# Patient Record
Sex: FEMALE | Race: WHITE | HISPANIC OR LATINO
[De-identification: names, ages, dates, MRNs, and addresses within clinical notes are randomized per-mention and may not be internally consistent; named-entity substitution may affect disease eponyms.]

---

## 2017-01-12 ENCOUNTER — APPOINTMENT (OUTPATIENT)
Dept: OBGYN | Facility: CLINIC | Age: 27
End: 2017-01-12

## 2019-05-20 ENCOUNTER — EMERGENCY (EMERGENCY)
Facility: HOSPITAL | Age: 29
LOS: 1 days | Discharge: HOME | End: 2019-05-20
Attending: EMERGENCY MEDICINE
Payer: MEDICAID

## 2019-05-20 VITALS
HEIGHT: 70 IN | OXYGEN SATURATION: 100 % | RESPIRATION RATE: 16 BRPM | WEIGHT: 141.98 LBS | DIASTOLIC BLOOD PRESSURE: 60 MMHG | SYSTOLIC BLOOD PRESSURE: 110 MMHG | TEMPERATURE: 98 F | HEART RATE: 71 BPM

## 2019-05-20 PROCEDURE — 76801 OB US < 14 WKS SINGLE FETUS: CPT

## 2019-05-20 PROCEDURE — 86900 BLOOD TYPING SEROLOGIC ABO: CPT

## 2019-05-20 PROCEDURE — 86850 RBC ANTIBODY SCREEN: CPT

## 2019-05-20 PROCEDURE — 76817 TRANSVAGINAL US OBSTETRIC: CPT

## 2019-05-20 PROCEDURE — 85027 COMPLETE CBC AUTOMATED: CPT

## 2019-05-20 PROCEDURE — 99284 EMERGENCY DEPT VISIT MOD MDM: CPT | Mod: 25

## 2019-05-20 PROCEDURE — 76801 OB US < 14 WKS SINGLE FETUS: CPT | Mod: 26

## 2019-05-20 PROCEDURE — 76817 TRANSVAGINAL US OBSTETRIC: CPT | Mod: 26

## 2019-05-20 PROCEDURE — 99285 EMERGENCY DEPT VISIT HI MDM: CPT

## 2019-05-20 PROCEDURE — 36415 COLL VENOUS BLD VENIPUNCTURE: CPT

## 2019-05-20 NOTE — ED PROVIDER NOTE - OBJECTIVE STATEMENT
29 y/o F, , with no significant PMHx and no significant PSHx presents to the ED with c/o lower back and lower abd pain x yesterday. Pt states she is 8 weeks pregnancy but has not had any prenatal care. Pt notes she has had 1 ectopic pregnancy in the past, so she is concerned. Pt denies urinary sxs, vaginal bleeding, or any other complaints. NKDA.

## 2019-05-20 NOTE — ED PROVIDER NOTE - CLINICAL SUMMARY MEDICAL DECISION MAKING FREE TEXT BOX
29 y/o F, , 8 weeks pregnant, presents to the Ed with lower back and lower abd pain x yesterday. Will check US, labs, and reassess.

## 2019-05-20 NOTE — ED ADULT TRIAGE NOTE - CHIEF COMPLAINT QUOTE
interpreting " she's having pain on her back and lower stomach since yesterday, no blood, she's eight weeks pregnant "

## 2019-05-20 NOTE — ED PROVIDER NOTE - PROGRESS NOTE DETAILS
S.o from Dr. Rush, f/u diapnotics US reported SLIUP at 8wk. Pt has no current complaints and requesting discharge. Pt is well appearing walking with normal gait, stable for discharge and follow up with medical doctor. Pt educated on care and need for follow up. Discussed anticipatory guidance and return precautions. Questions answered. I had a detailed discussion with the patient and/or guardian regarding the historical points, exam findings, and any diagnostic results supporting the discharge diagnosis.

## 2019-05-21 VITALS
SYSTOLIC BLOOD PRESSURE: 108 MMHG | OXYGEN SATURATION: 100 % | HEART RATE: 82 BPM | TEMPERATURE: 98 F | RESPIRATION RATE: 16 BRPM | DIASTOLIC BLOOD PRESSURE: 68 MMHG

## 2019-05-21 LAB
HCT VFR BLD CALC: 38.3 % — SIGNIFICANT CHANGE UP (ref 34.5–45)
HGB BLD-MCNC: 11.8 G/DL — SIGNIFICANT CHANGE UP (ref 11.5–15.5)
MCHC RBC-ENTMCNC: 24.4 PG — LOW (ref 27–34)
MCHC RBC-ENTMCNC: 30.8 GM/DL — LOW (ref 32–36)
MCV RBC AUTO: 79.1 FL — LOW (ref 80–100)
PLATELET # BLD AUTO: 296 K/UL — SIGNIFICANT CHANGE UP (ref 150–400)
RBC # BLD: 4.84 M/UL — SIGNIFICANT CHANGE UP (ref 3.8–5.2)
RBC # FLD: 20.6 % — HIGH (ref 10.3–14.5)
WBC # BLD: 8.5 K/UL — SIGNIFICANT CHANGE UP (ref 3.8–10.5)
WBC # FLD AUTO: 8.5 K/UL — SIGNIFICANT CHANGE UP (ref 3.8–10.5)

## 2019-05-24 ENCOUNTER — APPOINTMENT (OUTPATIENT)
Dept: OBGYN | Facility: CLINIC | Age: 29
End: 2019-05-24

## 2021-06-08 ENCOUNTER — EMERGENCY (EMERGENCY)
Facility: HOSPITAL | Age: 31
LOS: 1 days | Discharge: HOME | End: 2021-06-08
Attending: EMERGENCY MEDICINE
Payer: MEDICAID

## 2021-06-08 VITALS
WEIGHT: 134.92 LBS | HEART RATE: 89 BPM | DIASTOLIC BLOOD PRESSURE: 67 MMHG | OXYGEN SATURATION: 98 % | SYSTOLIC BLOOD PRESSURE: 101 MMHG | HEIGHT: 70 IN | RESPIRATION RATE: 18 BRPM | TEMPERATURE: 99 F

## 2021-06-08 DIAGNOSIS — Z98.51 TUBAL LIGATION STATUS: Chronic | ICD-10-CM

## 2021-06-08 LAB
APPEARANCE UR: ABNORMAL
BILIRUB UR-MCNC: NEGATIVE — SIGNIFICANT CHANGE UP
COLOR SPEC: ABNORMAL
DIFF PNL FLD: ABNORMAL
GLUCOSE UR QL: NEGATIVE — SIGNIFICANT CHANGE UP
KETONES UR-MCNC: ABNORMAL
LEUKOCYTE ESTERASE UR-ACNC: ABNORMAL
NITRITE UR-MCNC: NEGATIVE — SIGNIFICANT CHANGE UP
PH UR: 6.5 — SIGNIFICANT CHANGE UP (ref 5–8)
PROT UR-MCNC: 500 MG/DL
SP GR SPEC: 1.02 — SIGNIFICANT CHANGE UP (ref 1.01–1.02)
UROBILINOGEN FLD QL: NEGATIVE — SIGNIFICANT CHANGE UP

## 2021-06-08 PROCEDURE — 87086 URINE CULTURE/COLONY COUNT: CPT

## 2021-06-08 PROCEDURE — 87491 CHLMYD TRACH DNA AMP PROBE: CPT

## 2021-06-08 PROCEDURE — 81025 URINE PREGNANCY TEST: CPT

## 2021-06-08 PROCEDURE — 87591 N.GONORRHOEAE DNA AMP PROB: CPT

## 2021-06-08 PROCEDURE — 99283 EMERGENCY DEPT VISIT LOW MDM: CPT

## 2021-06-08 PROCEDURE — 81001 URINALYSIS AUTO W/SCOPE: CPT

## 2021-06-08 RX ORDER — PHENAZOPYRIDINE HCL 100 MG
2 TABLET ORAL
Qty: 12 | Refills: 0
Start: 2021-06-08 | End: 2021-06-09

## 2021-06-08 RX ORDER — NITROFURANTOIN MACROCRYSTAL 50 MG
1 CAPSULE ORAL
Qty: 14 | Refills: 0
Start: 2021-06-08 | End: 2021-06-14

## 2021-06-08 NOTE — ED PROVIDER NOTE - OBJECTIVE STATEMENT
29 y/o female, previously healthy, h/o remote b/l tubal ligation, presents with dysuria and hematuria, as well as non-radiating suprapubic abdominal cramping since today. Pain is present when she has the urge to go and at the end of urination. She has never had symptomatic UTI, although had UTI once during pregnancy on testing. Denies history of kidney stones, vaginal discharge or bleeding, fever, vomiting, diarrhea, constipation, or all other symptoms. Relates she is monogamous with her  of 9 years. 29 y/o female, previously healthy, h/o remote b/l tubal ligation, presents with dysuria and hematuria, as well as non-radiating suprapubic abdominal cramping since today. Pain is present when she has the urge to go and at the end of urination only. She has never had symptomatic UTI, although had UTI once during pregnancy on testing. Denies history of kidney stones, vaginal discharge or bleeding, fever, vomiting, diarrhea, constipation, or all other symptoms. Relates she is monogamous with her  of 9 years.

## 2021-06-08 NOTE — ED PROVIDER NOTE - PHYSICAL EXAMINATION
Afebrile, hemodynamically stable, saturating well  NAD, well appearing, sitting comfortably in bed  Head NCAT  EOMI grossly, anicteric  MMM  RRR, nml S1/S2, no m/r/g  Lungs CTAB, no w/r/r  Abd soft, NT, ND, nml BS, no rebound or guarding, no CVAT  AAO, CN's 3-12 grossly intact  ALEXANDRA spontaneously, no leg cyanosis or edema  Skin warm, well perfused, no rashes or hives

## 2021-06-08 NOTE — ED PROVIDER NOTE - NSFOLLOWUPINSTRUCTIONS_ED_ALL_ED_FT
Will un seguimiento con rivera médico de atención primaria u obstetra / ginecólogo en 1-2 días.  Utilice el antibiótico (Macrobid) y el analgésico según lo prescrito.  También puede usar ibuprofeno o acetaminofén según sea necesario para el dolor.  Regrese al departamento de emergencias si el dolor, los mareos, los vómitos, la fiebre, el dolor de espalda o cualquier otro síntoma empeoran.      Infección del tracto urinario en mujeres    LO QUE NECESITA SABER:    Nicolas infección en el tracto urinario (ITU) ocurre cuando entran bacterias en rivera tracto urinario. La mayoría de las bacterias que entran al tracto urinario salen al orinar. Si la bacteria permanece en el tracto urinario, usted podría contraer nicolas infección. Rivera tracto urinario incluye miah riñones, uréteres, vejiga y uretra. La orina es producida en los riñones y fluye del uréter a la vejiga. La orina sale de la vejiga a través de la uretra. Nicolas infección del tracto urinario es más común in la parte inferior de rivera tracto urinario que incluye rivera vejiga y uretra.     Aparato urinario femenino    INSTRUCCIONES SOBRE EL ARMAAN HOSPITALARIA:    Regrese a la zacarias de emergencias si:  •Usted está orinando muy poco o nada en absoluto.  •Usted tiene fiebre armaan con temblor y escalofríos.  •Usted tiene dolor en el costado o en la espalda que empeora.    Llame a rivera médico si:  •Tiene fiebre.  •Usted no siente mejoría después de 2 días de jia los antibióticos.  •Usted está vomitando.  •Usted tiene preguntas o inquietudes acerca de rivera condición o cuidado.    Medicamentos:  •Los antibióticosayudan a combatir nicolas infección bacteriana. Si tiene infecciones urinarias con frecuencia (llamadas recurrentes), se le pueden claudio antibióticos para que los tome regularmente. Le enseñarán cuándo y cómo usar los antibióticos. El objetivo es prevenir las infecciones urinarias, jakob no causar resistencia a los antibióticos mediante el uso de antibióticos con demasiada frecuencia.  •Los medicamentospara disminuir el dolor y el ardor al orinar. También ayudarán a disminuir la sensación de necesitar orinar con frecuencia. Estos medicamentos harán que orine de color anaranjado o vail.  •[address] miah medicamentos eric se le haya indicado.Consulte con rivera médico si usted jaren que rivera medicamento no le está ayudando o si presenta efectos secundarios. Infórmele si es alérgico a cualquier medicamento. Mantenga nicolas lista actualizada de los medicamentos, las vitaminas y los productos herbales que jasmeet. Incluya los siguientes datos de los medicamentos: cantidad, frecuencia y motivo de administración. Traiga con usted la lista o los envases de las píldoras a miah citas de seguimiento. Lleve la lista de los medicamentos con usted en rafael de nicolas emergencia.    Acuda a miah consultas de control con rivera médico según le indicaron.Anote miah preguntas para que se acuerde de hacerlas mariana miah visitas.    Evite otra ITU:  •Vacíe la vejiga con frecuencia.Orine y vacíe la vejiga tan pronto eric usted sienta la necesidad. No retenga la orina por largos períodos de tiempo.  •Límpiese de adelante hacia atrás después de orinar o de tener nicolas evacuación intestinal.[facility] ayudará a evitar que los gérmenes entren en el tracto urinario a través de la uretra.  •[address] líquidos eric se le haya indicado.Pregunte cuánto líquido debe jia cada día y cuáles líquidos son los más adecuados para usted. Es probable que usted necesite jia más líquidos de lo habitual para ayudar a deshacerse de la bacteria. No tome alcohol, cafeína ni jugos cítricos. Estos pueden irritar rivera vejiga y aumentar miah síntomas. Rivera médico puede recomendarle el jugo de arándano para prevenir nicolas infección urinaria.  •Orine después de tener relaciones sexuales.[facility] puede ayudar a eliminar las bacterias que pasan mariana el sexo.  •No tome duchas vaginales ni use desodorantes femeninos.Estos pueden cambiar el equilibrio químico de la vagina.  •Cambie las compresas o los tampones a menudo.[facility] ayudará a evitar que los gérmenes entren en el tracto urinario.  •Consulte con rivera médico sobre los métodos anticonceptivos.Es posible que tenga que cambiar rivera método si está aumentando rivera riesgo de infecciones urinarias.  •Use ropa interior de algodón y ropa suelta.Los pantalones ajustados y la ropa interior de nylon pueden atrapar humedad y hacer que las bacterias crezcan.  •Se puede recomendar el uso de estrógeno vaginal.[facility] medicamento ayuda a prevenir las infecciones urinarias en mujeres que osman pasado por la menopausia o que están en la perimenopausia.  •Realice ejercicios para los músculos pélvicos con frecuencia.Los ejercicios para los músculos pélvicos ayudan a empezar y parar de orinar. Los músculos pélvicos maxime ayudan a vaciar la vejiga más fácilmente. Apriete estos músculos firmemente mariana 5 segundos eric si estuviera tratando de retener el flujo de orina. Luego relaje por 5 segundos. Aumente gradualmente a 10 segundos. Will 3 series de 15 repeticiones al día o eric se le indique.

## 2021-06-08 NOTE — ED ADULT NURSE NOTE - NSIMPLEMENTINTERV_GEN_ALL_ED
Implemented All Universal Safety Interventions:  [place] to call system. Call bell, personal items and telephone within reach. Instruct patient to call for assistance. Room bathroom lighting operational. Non-slip footwear when patient is off stretcher. Physically safe environment: no spills, clutter or unnecessary equipment. Stretcher in lowest position, wheels locked, appropriate side rails in place.

## 2021-06-08 NOTE — ED PROVIDER NOTE - CLINICAL SUMMARY MEDICAL DECISION MAKING FREE TEXT BOX
Abdomen entirely benign with low suspicion for acute process including appe. Character low suspicion for PID. Character and appearance low suspicion for torsion. Character and location likewise low suspicion for kidney stone, higher suspicion for UTI as cause of hemorrhagic cystitis. Patient is well appearing, NAD, afebrile, hemodynamically stable. Any available tests and studies were discussed with patient. Discharged with rx Macrobid, pyridium, instructions in further symptomatic care, return precautions, and need for PMD f/u. Abdomen entirely benign with low suspicion for acute process including appe. Character low suspicion for PID. Character and appearance low suspicion for torsion. Character and location likewise low suspicion for kidney stone, higher suspicion for UTI as cause of hemorrhagic cystitis. Bedside US with no pelvic free fluid or e/o hydro. Patient is well appearing, NAD, afebrile, hemodynamically stable. Any available tests and studies were discussed with patient. Discharged with rx Macrobid, pyridium, instructions in further symptomatic care, return precautions, and need for PMD f/u. Declines discharge , uses herself and me.

## 2021-06-08 NOTE — ED PROVIDER NOTE - PATIENT PORTAL LINK FT
You can access the FollowMyHealth Patient Portal offered by [facility] by registering at the following website: [email or web address]. By joining [employer]’s FollowMyHealth portal, you will also be able to view your health information using other applications (apps) compatible with our system.

## 2021-06-09 LAB
C TRACH RRNA SPEC QL NAA+PROBE: SIGNIFICANT CHANGE UP
N GONORRHOEA RRNA SPEC QL NAA+PROBE: SIGNIFICANT CHANGE UP
SPECIMEN SOURCE: SIGNIFICANT CHANGE UP

## 2021-06-10 LAB
CULTURE RESULTS: NO GROWTH — SIGNIFICANT CHANGE UP
SPECIMEN SOURCE: SIGNIFICANT CHANGE UP

## 2022-09-25 NOTE — ED PROVIDER NOTE - NSFOLLOWUPCLINICS_GEN_ALL_ED_FT
[facility]  [facility]  [address] - [address] [place] [address]  Phone: [phone]  Fax: [phone]  Follow Up Time:

## 2022-12-19 NOTE — ED ADULT NURSE NOTE - PAIN RATING/NUMBER SCALE (0-10): REST
[facility] ambulatory encounter  ENDOCRINOLOGY THYROID    CC:   Chief Complaint   Patient presents with   • Other     Follow-up       PCP:  Kaylan Aguilar MD  Referring Physician: Dr Rubio    SUBJECTIVE:  Ave Weaver is a 34 year old female who is here today because of a follow up of thyroid autoimmunity and Obesity that is being treated.    Since last visit:  She has done well with Wegovy (near 10kg weight loss)  She feels her cravings have disappeared  She works out with cardio and resistance    She has been on vitamin D, iron and iodine/selenium supplementation.  Levels have been stable    She currently has an IUD.    Has stopped the lexapro with good results  Has stopped the losartan with good results     On Buspar for mood stabilization and trazodone for sleep.    She is currently taking low dose 2.5mg cytomel daily and this has helped with brain fog and afternoon fatigue.  Labs reviewed and we have seen trends of slow changes in TSH and free t4.     Mother has Hypothyroidism.    Father and both maternal and paternal grandfathers had HTN.    REVIEW OF SYSTEMS  All other systems are reviewed and are negative except as documented in the history of present illness.    OBJECTIVE:  PAST HISTORIES:  I have reviewed the past medical history, family history, social history, medications and allergies listed in the medical record as obtained by my nursing staff and support staff and agree with their documentation.    PHYSICAL EXAM  VITAL SIGNS:    Visit Vitals  /59 (BP Location: RUE - Right upper extremity, Patient Position: Sitting)   Pulse 86   Temp 97.8 °F (36.6 °C) (Temporal)   Resp 16   Ht 5' 4\" (1.626 m) Comment: Last visit   Wt 73.3 kg (161 lb 8 oz)   LMP 07/09/2021 (LMP Unknown) Comment: Mirena IUD 1/11/21   BMI 27.72 kg/m²     Body mass index is 27.72 kg/m².    WEIGHTS:   Wt Readings from Last 4 Encounters:   12/19/22 73.3 kg (161 lb 8 oz)   08/11/22 80.3 kg (177 lb)   06/27/22 81.4 kg  (179 lb 6.4 oz)   06/15/22 83.5 kg (184 lb)     GENERAL:  Oriented x 3. Appears well-developed and well-nourished. NAD. Obese  SKIN: Warm, dry and intact. No rashes, bruises, lesions or acanthosis nigricans.  EYES: clear conjunctivae and anicteric  NECK: normal thyroid to palpation  LUNGS: Non-labored respirations.   ABDOMEN: Soft and non-tender.  EXTREMITIES: Normal range of motion x 4. No edema or tenderness  NEURO:  No tremors or motor deficits. Stable gait.  PSYCHIATRIC: Normal mood and affect.                LAB REVIEW  TSH (mcUnits/mL)   Date Value   12/16/2022 2.322   06/24/2022 2.271   02/22/2022 1.719     TPO of 1,183 June 2022    Vitamin D, 25-Hydroxy (ng/mL)   Date Value   06/24/2022 68.8     Ferritin of 88 June 2022    IMAGING REVIEW  None    ASSESSMENT  1. Hypothyroidism, unspecified type    2. Obesity (BMI 30.0-34.9)    3. Anti-TPO antibodies present        PLAN    Has family history, mild positive antibodies for hashimotos and some variable findings with TSH. We discussed inefficiency and support and differences between subclinical changes and overt hypothyroidism.    Continue trial of 2.5mg of cytomel at noon  Continue thyroid support with iodine and selenium daily    She will continue vitamin D and iron and she separates this out and overall feels well.    Continue on GLP1 analog and continue titration for optimization. She is doing well on this    Has done well with weight loss, looking at continuing course until next summer    We are monitoring for ACS (autonomous cortisol secretion, borderline suppression, borderline DHEAS, borderline HTN) no intervention currently.    LDL reviewed, TG have vastly improved. Continue current course    Repeat thyroid panel in 6 months     Patient understood plan of care and we will follow.    Jose C Lock MD   5